# Patient Record
Sex: FEMALE | Race: WHITE | NOT HISPANIC OR LATINO | URBAN - METROPOLITAN AREA
[De-identification: names, ages, dates, MRNs, and addresses within clinical notes are randomized per-mention and may not be internally consistent; named-entity substitution may affect disease eponyms.]

---

## 2019-04-12 ENCOUNTER — OFFICE (OUTPATIENT)
Dept: URBAN - METROPOLITAN AREA CLINIC 33 | Facility: CLINIC | Age: 79
End: 2019-04-12
Payer: COMMERCIAL

## 2019-04-12 VITALS
HEIGHT: 63 IN | WEIGHT: 154 LBS | DIASTOLIC BLOOD PRESSURE: 64 MMHG | HEART RATE: 76 BPM | SYSTOLIC BLOOD PRESSURE: 109 MMHG | TEMPERATURE: 97.7 F

## 2019-04-12 DIAGNOSIS — Z86.010 PERSONAL HISTORY OF COLONIC POLYPS: ICD-10-CM

## 2019-04-12 DIAGNOSIS — Z85.3 PERSONAL HISTORY OF MALIGNANT NEOPLASM OF BREAST: ICD-10-CM

## 2019-04-12 PROCEDURE — 99201: CPT

## 2019-04-12 NOTE — SERVICEHPINOTES
CECIL GILLILAND   is a   78   year old    female who is being seen in consultation at the request of   ALEC PARK   for OV prior to colonoscopy. Her last colonoscopy was 5/2012--hyperplastic polyp, recall 7 years. She has been under a lot of stress recently as she is moving her mother into an assisted living facility  and traveling to [place] frequently. Has had some constipation due to traveling but otherwise no GI issues.  Denies rectal bleeding, abd pain, weight loss. She denies family hx of colon cancer or polyps. She had breast cancer in 2006 and 2011, s/p lumpectomy and partial mastectomy follows with Dr Park annually. No cardiac or pulmonary issues.  BR

## 2019-07-03 ENCOUNTER — ON CAMPUS - OUTPATIENT (OUTPATIENT)
Dept: URBAN - METROPOLITAN AREA HOSPITAL 63 | Facility: HOSPITAL | Age: 79
End: 2019-07-03
Payer: COMMERCIAL

## 2019-07-03 DIAGNOSIS — K63.3 ULCER OF INTESTINE: ICD-10-CM

## 2019-07-03 DIAGNOSIS — Z86.010 PERSONAL HISTORY OF COLONIC POLYPS: ICD-10-CM

## 2019-07-03 PROCEDURE — 45380 COLONOSCOPY AND BIOPSY: CPT | Mod: PT

## 2020-08-24 ENCOUNTER — OFFICE (OUTPATIENT)
Dept: URBAN - METROPOLITAN AREA CLINIC 34 | Facility: CLINIC | Age: 80
End: 2020-08-24
Payer: COMMERCIAL

## 2020-08-24 VITALS
TEMPERATURE: 96.5 F | DIASTOLIC BLOOD PRESSURE: 68 MMHG | HEART RATE: 68 BPM | SYSTOLIC BLOOD PRESSURE: 121 MMHG | WEIGHT: 138 LBS | HEIGHT: 63 IN

## 2020-08-24 DIAGNOSIS — K59.09 OTHER CONSTIPATION: ICD-10-CM

## 2020-08-24 DIAGNOSIS — R49.0 DYSPHONIA: ICD-10-CM

## 2020-08-24 PROCEDURE — 99214 OFFICE O/P EST MOD 30 MIN: CPT

## 2020-08-24 NOTE — SERVICEHPINOTES
CECIL GILLILAND   is a   79  female who presents for follow up.BRPt reports she has been dealing with more constipation recently, perhaps due to increased stress. She has been dealing with the death of her grandson. She does eat a well balanced diet, vegetables and fruit. BMs used to be every morning then started going every 2-3 days. Having softer more regular stools currently. States she is drinking a lot of water. No abd pain but reports bloating/distention when constipated, which resolves after BMs. She has been dealing with a lot of back issues, has not been exercising much due to this has been doing yoga and walking which seems to help. She had a on colonoscopy 7/2019 which was normal aside from congestion in proximal transverse colon--biopsies showing acute inflammation (no chronic inflammation seen). She denies any rectal bleeding, diarrhea. Gadiel does report hoarseness recently, seems to worsen as the day goes on. She denies any dysphagia or odynophagia symptoms. No heartburn/reflux, n/v. Denies chronic cough. She was given pantoprazole 20mg rx to try to see if this improves hoarseness but has not yet started. She reports a weight loss of approx. 12lbs within past 8-12 months, believes this is due to walking and watching her diet. Gadiel had a normal CBC on 8/7. BR

## 2021-10-28 ENCOUNTER — OFFICE (OUTPATIENT)
Dept: URBAN - METROPOLITAN AREA CLINIC 34 | Facility: CLINIC | Age: 81
End: 2021-10-28
Payer: COMMERCIAL

## 2021-10-28 VITALS
WEIGHT: 133 LBS | HEART RATE: 71 BPM | DIASTOLIC BLOOD PRESSURE: 71 MMHG | HEIGHT: 63 IN | TEMPERATURE: 97.3 F | SYSTOLIC BLOOD PRESSURE: 116 MMHG

## 2021-10-28 DIAGNOSIS — R10.9 UNSPECIFIED ABDOMINAL PAIN: ICD-10-CM

## 2021-10-28 DIAGNOSIS — R63.4 ABNORMAL WEIGHT LOSS: ICD-10-CM

## 2021-10-28 DIAGNOSIS — R93.3 ABNORMAL FINDINGS ON DIAGNOSTIC IMAGING OF OTHER PARTS OF DI: ICD-10-CM

## 2021-10-28 PROCEDURE — 99214 OFFICE O/P EST MOD 30 MIN: CPT | Performed by: INTERNAL MEDICINE

## 2022-03-03 ENCOUNTER — PREPPED CHART (OUTPATIENT)
Dept: URBAN - METROPOLITAN AREA CLINIC 80 | Facility: CLINIC | Age: 82
End: 2022-03-03

## 2022-03-03 PROBLEM — Z96.1 PSEUDOPHAKIA: Noted: 2022-03-03

## 2022-03-03 PROBLEM — H35.3122 DRY AMD, INTERMEDIATE DRY STAGE: Status: WELL-CONTROLLED | Noted: 2022-03-03

## 2022-03-03 PROBLEM — H35.3211 NEOVASCULAR AMD WITH ACTIVE CNV: Status: ACTIVE | Noted: 2022-03-03

## 2022-03-03 PROBLEM — H35.3231 NEOVASCULAR AMD WITH ACTIVE CNV: Status: ACTIVE | Noted: 2022-03-03

## 2022-03-03 PROBLEM — H35.3231 NEOVASCULAR AMD WITH ACTIVE CNV: Noted: 2022-03-03

## 2022-03-03 PROBLEM — H35.3211 NEOVASCULAR AMD WITH ACTIVE CNV: Noted: 2022-03-03

## 2022-03-03 PROBLEM — H35.3122 DRY AMD, INTERMEDIATE DRY STAGE: Noted: 2022-03-03

## 2022-04-12 ASSESSMENT — VISUAL ACUITY
OS_SC: 20/30-2
OD_SC: 20/30-2

## 2022-04-12 ASSESSMENT — TONOMETRY
OD_IOP_MMHG: 18
OS_IOP_MMHG: 18

## 2022-04-14 ENCOUNTER — FOLLOW UP (OUTPATIENT)
Dept: URBAN - METROPOLITAN AREA CLINIC 80 | Facility: CLINIC | Age: 82
End: 2022-04-14

## 2022-04-14 DIAGNOSIS — H35.3122: ICD-10-CM

## 2022-04-14 DIAGNOSIS — H35.3211: ICD-10-CM

## 2022-04-14 PROCEDURE — PFS EYLEA PFS

## 2022-04-14 PROCEDURE — 92014 COMPRE OPH EXAM EST PT 1/>: CPT | Mod: 25

## 2022-04-14 PROCEDURE — 92134 CPTRZ OPH DX IMG PST SGM RTA: CPT

## 2022-04-14 PROCEDURE — 67028 INJECTION EYE DRUG: CPT

## 2022-04-14 ASSESSMENT — VISUAL ACUITY
OD_PH: 20/30-2
OD_SC: 20/50
OS_SC: 20/30
OS_PH: 20/25-1

## 2022-04-14 ASSESSMENT — TONOMETRY
OS_IOP_MMHG: 18
OD_IOP_MMHG: 22

## 2022-05-12 ENCOUNTER — FOLLOW UP (OUTPATIENT)
Dept: URBAN - METROPOLITAN AREA CLINIC 80 | Facility: CLINIC | Age: 82
End: 2022-05-12

## 2022-05-12 DIAGNOSIS — H35.3211: ICD-10-CM

## 2022-05-12 DIAGNOSIS — H35.3122: ICD-10-CM

## 2022-05-12 PROCEDURE — 92014 COMPRE OPH EXAM EST PT 1/>: CPT | Mod: 25

## 2022-05-12 PROCEDURE — 92202 OPSCPY EXTND ON/MAC DRAW: CPT | Mod: 59

## 2022-05-12 PROCEDURE — 92134 CPTRZ OPH DX IMG PST SGM RTA: CPT

## 2022-05-12 PROCEDURE — 67028 INJECTION EYE DRUG: CPT

## 2022-05-12 PROCEDURE — PFS EYLEA PFS

## 2022-05-12 ASSESSMENT — VISUAL ACUITY
OD_PH: 20/30-1
OS_SC: 20/30-2
OD_SC: 20/60+1

## 2022-05-12 ASSESSMENT — TONOMETRY
OS_IOP_MMHG: 17
OD_IOP_MMHG: 20

## 2022-07-12 ENCOUNTER — FOLLOW UP (OUTPATIENT)
Dept: URBAN - METROPOLITAN AREA CLINIC 80 | Facility: CLINIC | Age: 82
End: 2022-07-12

## 2022-07-12 DIAGNOSIS — H35.3122: ICD-10-CM

## 2022-07-12 DIAGNOSIS — H35.3211: ICD-10-CM

## 2022-07-12 PROCEDURE — 67028 INJECTION EYE DRUG: CPT

## 2022-07-12 PROCEDURE — PFS EYLEA PFS

## 2022-07-12 PROCEDURE — 92134 CPTRZ OPH DX IMG PST SGM RTA: CPT

## 2022-07-12 PROCEDURE — 92202 OPSCPY EXTND ON/MAC DRAW: CPT | Mod: 59

## 2022-07-12 PROCEDURE — 92014 COMPRE OPH EXAM EST PT 1/>: CPT | Mod: 25

## 2022-07-12 ASSESSMENT — TONOMETRY
OD_IOP_MMHG: 18
OS_IOP_MMHG: 20

## 2022-07-12 ASSESSMENT — VISUAL ACUITY
OD_PH: 20/25
OS_SC: 20/40-1
OD_SC: 20/40+2
OS_PH: 20/30+2

## 2022-08-25 ENCOUNTER — FOLLOW UP (OUTPATIENT)
Dept: URBAN - METROPOLITAN AREA CLINIC 80 | Facility: CLINIC | Age: 82
End: 2022-08-25

## 2022-08-25 DIAGNOSIS — H35.3211: ICD-10-CM

## 2022-08-25 DIAGNOSIS — H35.3122: ICD-10-CM

## 2022-08-25 PROCEDURE — 92134 CPTRZ OPH DX IMG PST SGM RTA: CPT

## 2022-08-25 PROCEDURE — 92014 COMPRE OPH EXAM EST PT 1/>: CPT | Mod: 25

## 2022-08-25 PROCEDURE — 92202 OPSCPY EXTND ON/MAC DRAW: CPT | Mod: 59

## 2022-08-25 PROCEDURE — 67028 INJECTION EYE DRUG: CPT

## 2022-08-25 PROCEDURE — PFS EYLEA PFS

## 2022-08-25 ASSESSMENT — VISUAL ACUITY
OD_SC: 20/60+2
OS_SC: 20/40-1
OD_PH: 20/30+1
OS_PH: 20/30-2

## 2022-08-25 ASSESSMENT — TONOMETRY
OD_IOP_MMHG: 17
OS_IOP_MMHG: 16

## 2023-04-05 ENCOUNTER — FOLLOW UP (OUTPATIENT)
Dept: URBAN - METROPOLITAN AREA CLINIC 64 | Facility: CLINIC | Age: 83
End: 2023-04-05

## 2023-04-05 DIAGNOSIS — H35.3122: ICD-10-CM

## 2023-04-05 DIAGNOSIS — H35.3211: ICD-10-CM

## 2023-04-05 PROCEDURE — 92202 OPSCPY EXTND ON/MAC DRAW: CPT

## 2023-04-05 PROCEDURE — 92014 COMPRE OPH EXAM EST PT 1/>: CPT

## 2023-04-05 ASSESSMENT — VISUAL ACUITY
OD_SC: 20/40-1
OD_PH: 20/25+1
OS_SC: 20/50-2

## 2023-04-05 ASSESSMENT — TONOMETRY
OS_IOP_MMHG: 14
OD_IOP_MMHG: 21

## 2023-04-25 ENCOUNTER — FOLLOW UP (OUTPATIENT)
Dept: URBAN - METROPOLITAN AREA CLINIC 80 | Facility: CLINIC | Age: 83
End: 2023-04-25

## 2023-04-25 DIAGNOSIS — H35.3122: ICD-10-CM

## 2023-04-25 DIAGNOSIS — H35.3211: ICD-10-CM

## 2023-04-25 PROCEDURE — 92014 COMPRE OPH EXAM EST PT 1/>: CPT | Mod: 24

## 2023-04-25 PROCEDURE — 92202 OPSCPY EXTND ON/MAC DRAW: CPT | Mod: 59

## 2023-04-25 PROCEDURE — 67028 INJECTION EYE DRUG: CPT

## 2023-04-25 PROCEDURE — PFS EYLEA PFS

## 2023-04-25 PROCEDURE — 92134 CPTRZ OPH DX IMG PST SGM RTA: CPT

## 2023-04-25 ASSESSMENT — TONOMETRY
OD_IOP_MMHG: 14
OS_IOP_MMHG: 16

## 2023-04-25 ASSESSMENT — VISUAL ACUITY
OS_PH: 20/50-2
OD_PH: 20/25-1
OD_SC: 20/50-1
OS_SC: 20/80-2

## 2023-06-20 ENCOUNTER — FOLLOW UP (OUTPATIENT)
Dept: URBAN - METROPOLITAN AREA CLINIC 80 | Facility: CLINIC | Age: 83
End: 2023-06-20

## 2023-06-20 DIAGNOSIS — H35.3122: ICD-10-CM

## 2023-06-20 DIAGNOSIS — H35.3211: ICD-10-CM

## 2023-06-20 PROCEDURE — 92014 COMPRE OPH EXAM EST PT 1/>: CPT | Mod: 25

## 2023-06-20 PROCEDURE — PFS EYLEA PFS

## 2023-06-20 PROCEDURE — 92202 OPSCPY EXTND ON/MAC DRAW: CPT | Mod: 59

## 2023-06-20 PROCEDURE — 67028 INJECTION EYE DRUG: CPT

## 2023-06-20 PROCEDURE — 92134 CPTRZ OPH DX IMG PST SGM RTA: CPT

## 2023-06-20 ASSESSMENT — TONOMETRY
OS_IOP_MMHG: 15
OD_IOP_MMHG: 16

## 2023-06-20 ASSESSMENT — VISUAL ACUITY
OD_SC: 20/60+1
OD_PH: 20/30-2
OS_SC: 20/50-3

## 2023-08-31 ENCOUNTER — FOLLOW UP (OUTPATIENT)
Dept: URBAN - METROPOLITAN AREA CLINIC 80 | Facility: CLINIC | Age: 83
End: 2023-08-31

## 2023-08-31 DIAGNOSIS — Z96.1: ICD-10-CM

## 2023-08-31 DIAGNOSIS — H35.3122: ICD-10-CM

## 2023-08-31 DIAGNOSIS — H35.3211: ICD-10-CM

## 2023-08-31 PROCEDURE — 92014 COMPRE OPH EXAM EST PT 1/>: CPT | Mod: 25

## 2023-08-31 PROCEDURE — 67028 INJECTION EYE DRUG: CPT

## 2023-08-31 PROCEDURE — 92202 OPSCPY EXTND ON/MAC DRAW: CPT | Mod: 59

## 2023-08-31 PROCEDURE — 92134 CPTRZ OPH DX IMG PST SGM RTA: CPT

## 2023-08-31 PROCEDURE — PFS EYLEA PFS: Mod: JZ

## 2023-08-31 ASSESSMENT — VISUAL ACUITY
OS_SC: 20/80+2
OD_PH: 20/30-1
OD_SC: 20/70-2

## 2023-08-31 ASSESSMENT — TONOMETRY
OS_IOP_MMHG: 16
OD_IOP_MMHG: 16

## 2023-10-10 ENCOUNTER — PROBLEM (OUTPATIENT)
Dept: URBAN - METROPOLITAN AREA CLINIC 80 | Facility: CLINIC | Age: 83
End: 2023-10-10

## 2023-10-10 DIAGNOSIS — H26.491: ICD-10-CM

## 2023-10-10 DIAGNOSIS — H35.3231: ICD-10-CM

## 2023-10-10 DIAGNOSIS — Z96.1: ICD-10-CM

## 2023-10-10 PROCEDURE — 67028 INJECTION EYE DRUG: CPT

## 2023-10-10 PROCEDURE — 92014 COMPRE OPH EXAM EST PT 1/>: CPT | Mod: 25

## 2023-10-10 PROCEDURE — 92134 CPTRZ OPH DX IMG PST SGM RTA: CPT

## 2023-10-10 PROCEDURE — PFS EYLEA PFS: Mod: JZ

## 2023-10-10 ASSESSMENT — TONOMETRY
OD_IOP_MMHG: 19
OS_IOP_MMHG: 18

## 2023-10-10 ASSESSMENT — VISUAL ACUITY
OD_PH: 20/50-1
OS_SC: 20/50-1
OD_SC: 20/70-1
OS_PH: 20/30-2

## 2023-10-24 ENCOUNTER — INJECTION ONLY (OUTPATIENT)
Dept: URBAN - METROPOLITAN AREA CLINIC 80 | Facility: CLINIC | Age: 83
End: 2023-10-24

## 2023-10-24 DIAGNOSIS — H35.3231: ICD-10-CM

## 2023-10-24 PROCEDURE — HD EYLEA HD 8MG: Mod: JZ

## 2023-10-24 PROCEDURE — 67028 INJECTION EYE DRUG: CPT

## 2023-10-24 ASSESSMENT — TONOMETRY
OD_IOP_MMHG: 17
OS_IOP_MMHG: 19

## 2023-10-24 ASSESSMENT — VISUAL ACUITY
OD_SC: 20/80+2
OD_PH: 20/30
OS_SC: 20/40+2

## 2023-12-05 ENCOUNTER — FOLLOW UP (OUTPATIENT)
Dept: URBAN - METROPOLITAN AREA CLINIC 80 | Facility: CLINIC | Age: 83
End: 2023-12-05

## 2023-12-05 DIAGNOSIS — Z96.1: ICD-10-CM

## 2023-12-05 DIAGNOSIS — H26.491: ICD-10-CM

## 2023-12-05 DIAGNOSIS — H35.3231: ICD-10-CM

## 2023-12-05 PROCEDURE — 67028 INJECTION EYE DRUG: CPT

## 2023-12-05 PROCEDURE — HD EYLEA HD 8MG: Mod: JZ

## 2023-12-05 PROCEDURE — 92134 CPTRZ OPH DX IMG PST SGM RTA: CPT

## 2023-12-05 PROCEDURE — 92202 OPSCPY EXTND ON/MAC DRAW: CPT | Mod: 59

## 2023-12-05 PROCEDURE — 92014 COMPRE OPH EXAM EST PT 1/>: CPT | Mod: 25

## 2023-12-05 ASSESSMENT — TONOMETRY
OD_IOP_MMHG: 16
OS_IOP_MMHG: 16

## 2023-12-05 ASSESSMENT — VISUAL ACUITY
OS_SC: 20/40+2
OD_PH: 20/40-1
OD_SC: 20/70+1

## 2024-03-07 ENCOUNTER — FOLLOW UP (OUTPATIENT)
Dept: URBAN - METROPOLITAN AREA CLINIC 80 | Facility: CLINIC | Age: 84
End: 2024-03-07

## 2024-03-07 DIAGNOSIS — H35.3231: ICD-10-CM

## 2024-03-07 DIAGNOSIS — H26.491: ICD-10-CM

## 2024-03-07 DIAGNOSIS — Z96.1: ICD-10-CM

## 2024-03-07 PROCEDURE — 92134 CPTRZ OPH DX IMG PST SGM RTA: CPT

## 2024-03-07 PROCEDURE — 67028 INJECTION EYE DRUG: CPT

## 2024-03-07 PROCEDURE — 92014 COMPRE OPH EXAM EST PT 1/>: CPT | Mod: 25

## 2024-03-07 PROCEDURE — HD EYLEA HD 8MG: Mod: JZ

## 2024-03-07 ASSESSMENT — VISUAL ACUITY
OD_PH: 20/40-2
OD_SC: 20/80
OS_SC: 20/40

## 2024-03-07 ASSESSMENT — TONOMETRY
OS_IOP_MMHG: 16
OD_IOP_MMHG: 14

## 2024-03-13 ENCOUNTER — FOLLOW UP (OUTPATIENT)
Dept: URBAN - METROPOLITAN AREA CLINIC 66 | Facility: CLINIC | Age: 84
End: 2024-03-13

## 2024-03-13 DIAGNOSIS — H35.3231: ICD-10-CM

## 2024-03-13 DIAGNOSIS — H26.491: ICD-10-CM

## 2024-03-13 DIAGNOSIS — Z96.1: ICD-10-CM

## 2024-03-13 PROCEDURE — HD EYLEA HD 8MG: Mod: JZ

## 2024-03-13 PROCEDURE — 67028 INJECTION EYE DRUG: CPT

## 2024-03-13 PROCEDURE — 92134 CPTRZ OPH DX IMG PST SGM RTA: CPT

## 2024-03-13 PROCEDURE — 92014 COMPRE OPH EXAM EST PT 1/>: CPT

## 2024-03-13 ASSESSMENT — VISUAL ACUITY
OD_SC: 20/60-2
OS_SC: 20/25-1

## 2024-03-13 ASSESSMENT — TONOMETRY
OS_IOP_MMHG: 17
OD_IOP_MMHG: 20

## 2024-04-03 ENCOUNTER — FOLLOW UP (OUTPATIENT)
Dept: URBAN - METROPOLITAN AREA CLINIC 66 | Facility: CLINIC | Age: 84
End: 2024-04-03

## 2024-04-03 DIAGNOSIS — H02.883: ICD-10-CM

## 2024-04-03 DIAGNOSIS — H02.886: ICD-10-CM

## 2024-04-03 DIAGNOSIS — H35.3231: ICD-10-CM

## 2024-04-03 PROCEDURE — 92202 OPSCPY EXTND ON/MAC DRAW: CPT

## 2024-04-03 PROCEDURE — 92134 CPTRZ OPH DX IMG PST SGM RTA: CPT

## 2024-04-03 PROCEDURE — 92014 COMPRE OPH EXAM EST PT 1/>: CPT

## 2024-04-03 ASSESSMENT — TONOMETRY
OS_IOP_MMHG: 14
OD_IOP_MMHG: 20

## 2024-04-03 ASSESSMENT — VISUAL ACUITY
OD_PH: 20/40-2
OD_SC: 20/80
OS_SC: 20/30

## 2024-04-09 ENCOUNTER — INJECTION ONLY (OUTPATIENT)
Dept: URBAN - METROPOLITAN AREA CLINIC 66 | Facility: CLINIC | Age: 84
End: 2024-04-09

## 2024-04-09 DIAGNOSIS — H35.3231: ICD-10-CM

## 2024-04-09 PROCEDURE — 67028 INJECTION EYE DRUG: CPT

## 2024-04-09 ASSESSMENT — VISUAL ACUITY
OD_SC: 20/70+2
OD_PH: 20/50+1
OS_SC: 20/25-1

## 2024-04-09 ASSESSMENT — TONOMETRY
OS_IOP_MMHG: 15
OD_IOP_MMHG: 17

## 2024-04-26 ENCOUNTER — FOLLOW UP (OUTPATIENT)
Dept: URBAN - METROPOLITAN AREA CLINIC 66 | Facility: CLINIC | Age: 84
End: 2024-04-26

## 2024-04-26 DIAGNOSIS — H35.3231: ICD-10-CM

## 2024-04-26 PROCEDURE — 92202 OPSCPY EXTND ON/MAC DRAW: CPT | Mod: 59

## 2024-04-26 PROCEDURE — 92014 COMPRE OPH EXAM EST PT 1/>: CPT | Mod: 25

## 2024-04-26 PROCEDURE — 92134 CPTRZ OPH DX IMG PST SGM RTA: CPT

## 2024-04-26 PROCEDURE — 67028 INJECTION EYE DRUG: CPT

## 2024-04-26 ASSESSMENT — TONOMETRY
OD_IOP_MMHG: 18
OS_IOP_MMHG: 19

## 2024-04-26 ASSESSMENT — VISUAL ACUITY
OS_SC: 20/25-2
OD_SC: 20/80
OD_PH: 20/40-1

## 2024-06-07 ENCOUNTER — PROCEDURE ONLY (OUTPATIENT)
Dept: URBAN - METROPOLITAN AREA CLINIC 66 | Facility: CLINIC | Age: 84
End: 2024-06-07

## 2024-06-07 DIAGNOSIS — H35.3231: ICD-10-CM

## 2024-06-07 PROCEDURE — 67028 INJECTION EYE DRUG: CPT

## 2024-06-07 PROCEDURE — 92134 CPTRZ OPH DX IMG PST SGM RTA: CPT

## 2024-06-07 ASSESSMENT — TONOMETRY
OS_IOP_MMHG: 15
OD_IOP_MMHG: 18

## 2024-06-07 ASSESSMENT — VISUAL ACUITY
OS_SC: 20/20+1
OD_SC: 20/70

## 2024-06-14 ENCOUNTER — FOLLOW UP (OUTPATIENT)
Dept: URBAN - METROPOLITAN AREA CLINIC 66 | Facility: CLINIC | Age: 84
End: 2024-06-14

## 2024-06-14 DIAGNOSIS — H35.3231: ICD-10-CM

## 2024-06-14 PROCEDURE — 92134 CPTRZ OPH DX IMG PST SGM RTA: CPT

## 2024-06-14 PROCEDURE — 67028 INJECTION EYE DRUG: CPT

## 2024-06-14 ASSESSMENT — VISUAL ACUITY
OD_SC: 20/80-3
OD_PH: 20/50
OS_SC: 20/25

## 2024-06-14 ASSESSMENT — TONOMETRY
OS_IOP_MMHG: 20
OD_IOP_MMHG: 17

## 2024-10-23 ENCOUNTER — FOLLOW UP (OUTPATIENT)
Dept: URBAN - METROPOLITAN AREA CLINIC 66 | Facility: CLINIC | Age: 84
End: 2024-10-23

## 2024-10-23 DIAGNOSIS — H35.3231: ICD-10-CM

## 2024-10-23 PROCEDURE — 92014 COMPRE OPH EXAM EST PT 1/>: CPT

## 2024-10-23 PROCEDURE — 67028 INJECTION EYE DRUG: CPT

## 2024-10-23 PROCEDURE — 92134 CPTRZ OPH DX IMG PST SGM RTA: CPT

## 2024-10-23 PROCEDURE — 92202 OPSCPY EXTND ON/MAC DRAW: CPT

## 2024-10-23 ASSESSMENT — TONOMETRY
OS_IOP_MMHG: 16
OD_IOP_MMHG: 18

## 2024-10-23 ASSESSMENT — VISUAL ACUITY
OS_SC: 20/30-2
OD_PH: 20/40-2
OD_SC: 20/80-1

## 2024-12-04 ENCOUNTER — FOLLOW UP (OUTPATIENT)
Dept: URBAN - METROPOLITAN AREA CLINIC 66 | Facility: CLINIC | Age: 84
End: 2024-12-04

## 2024-12-04 DIAGNOSIS — H35.3231: ICD-10-CM

## 2024-12-04 PROCEDURE — 67028 INJECTION EYE DRUG: CPT

## 2024-12-04 PROCEDURE — 92134 CPTRZ OPH DX IMG PST SGM RTA: CPT

## 2024-12-04 PROCEDURE — 92014 COMPRE OPH EXAM EST PT 1/>: CPT | Mod: 25

## 2024-12-04 PROCEDURE — 92202 OPSCPY EXTND ON/MAC DRAW: CPT | Mod: 59

## 2024-12-04 ASSESSMENT — VISUAL ACUITY
OD_SC: 20/70-2
OS_SC: 20/30

## 2024-12-11 ENCOUNTER — WORKUP DONE, NOT SEEN (OUTPATIENT)
Dept: URBAN - METROPOLITAN AREA CLINIC 66 | Facility: CLINIC | Age: 84
End: 2024-12-11

## 2024-12-11 ASSESSMENT — TONOMETRY
OD_IOP_MMHG: 20
OS_IOP_MMHG: 17

## 2024-12-11 ASSESSMENT — VISUAL ACUITY
OS_SC: 20/30+1
OD_SC: 20/100+1

## 2025-01-29 ENCOUNTER — INJECTION ONLY (OUTPATIENT)
Dept: URBAN - METROPOLITAN AREA CLINIC 66 | Facility: CLINIC | Age: 85
End: 2025-01-29

## 2025-01-29 DIAGNOSIS — H35.3231: ICD-10-CM

## 2025-01-29 PROCEDURE — 92014 COMPRE OPH EXAM EST PT 1/>: CPT | Mod: 25

## 2025-01-29 PROCEDURE — 92202 OPSCPY EXTND ON/MAC DRAW: CPT | Mod: NC

## 2025-01-29 PROCEDURE — 67028 INJECTION EYE DRUG: CPT

## 2025-01-29 PROCEDURE — 92134 CPTRZ OPH DX IMG PST SGM RTA: CPT

## 2025-01-29 ASSESSMENT — VISUAL ACUITY
OS_SC: 20/30-2
OD_SC: 20/70-2
OD_PH: 20/60

## 2025-01-29 ASSESSMENT — TONOMETRY
OD_IOP_MMHG: 17
OS_IOP_MMHG: 15

## 2025-03-26 ENCOUNTER — FOLLOW UP (OUTPATIENT)
Dept: URBAN - METROPOLITAN AREA CLINIC 66 | Facility: CLINIC | Age: 85
End: 2025-03-26

## 2025-03-26 DIAGNOSIS — H35.3231: ICD-10-CM

## 2025-03-26 PROCEDURE — 92014 COMPRE OPH EXAM EST PT 1/>: CPT | Mod: 25

## 2025-03-26 PROCEDURE — 67028 INJECTION EYE DRUG: CPT

## 2025-03-26 PROCEDURE — 92202 OPSCPY EXTND ON/MAC DRAW: CPT | Mod: NC

## 2025-03-26 PROCEDURE — 92134 CPTRZ OPH DX IMG PST SGM RTA: CPT

## 2025-03-26 ASSESSMENT — VISUAL ACUITY: OD_SC: 20/80

## 2025-03-26 ASSESSMENT — TONOMETRY: OD_IOP_MMHG: 19

## 2025-04-09 ENCOUNTER — FOLLOW UP (OUTPATIENT)
Dept: URBAN - METROPOLITAN AREA CLINIC 66 | Facility: CLINIC | Age: 85
End: 2025-04-09

## 2025-04-09 DIAGNOSIS — H35.3231: ICD-10-CM

## 2025-04-09 PROCEDURE — 92014 COMPRE OPH EXAM EST PT 1/>: CPT | Mod: 25

## 2025-04-09 PROCEDURE — J0177S EYLEA HD 8MG SAMPLE

## 2025-04-09 PROCEDURE — 92134 CPTRZ OPH DX IMG PST SGM RTA: CPT

## 2025-04-09 PROCEDURE — 67028 INJECTION EYE DRUG: CPT

## 2025-04-09 PROCEDURE — 92202 OPSCPY EXTND ON/MAC DRAW: CPT | Mod: NC

## 2025-04-09 ASSESSMENT — VISUAL ACUITY
OD_PH: 20/60
OS_SC: 20/25
OD_SC: 20/60-2

## 2025-04-09 ASSESSMENT — TONOMETRY: OS_IOP_MMHG: 14
